# Patient Record
Sex: MALE | Race: WHITE | NOT HISPANIC OR LATINO | URBAN - METROPOLITAN AREA
[De-identification: names, ages, dates, MRNs, and addresses within clinical notes are randomized per-mention and may not be internally consistent; named-entity substitution may affect disease eponyms.]

---

## 2023-11-17 ENCOUNTER — ANCILLARY PROCEDURE (OUTPATIENT)
Dept: RADIOLOGY | Facility: CLINIC | Age: 53
End: 2023-11-17
Payer: COMMERCIAL

## 2023-11-17 ENCOUNTER — OFFICE VISIT (OUTPATIENT)
Dept: PRIMARY CARE | Facility: CLINIC | Age: 53
End: 2023-11-17
Payer: COMMERCIAL

## 2023-11-17 VITALS
SYSTOLIC BLOOD PRESSURE: 132 MMHG | TEMPERATURE: 97.2 F | BODY MASS INDEX: 33.5 KG/M2 | WEIGHT: 247 LBS | OXYGEN SATURATION: 98 % | DIASTOLIC BLOOD PRESSURE: 78 MMHG | HEART RATE: 62 BPM

## 2023-11-17 DIAGNOSIS — M25.572 ACUTE LEFT ANKLE PAIN: Primary | ICD-10-CM

## 2023-11-17 DIAGNOSIS — M25.572 ACUTE LEFT ANKLE PAIN: ICD-10-CM

## 2023-11-17 PROCEDURE — 1036F TOBACCO NON-USER: CPT | Performed by: FAMILY MEDICINE

## 2023-11-17 PROCEDURE — 73610 X-RAY EXAM OF ANKLE: CPT | Mod: LT,FY

## 2023-11-17 PROCEDURE — 99213 OFFICE O/P EST LOW 20 MIN: CPT | Performed by: FAMILY MEDICINE

## 2023-11-17 RX ORDER — TADALAFIL 5 MG/1
TABLET ORAL
COMMUNITY
Start: 2022-01-31 | End: 2024-01-22

## 2023-11-17 RX ORDER — RISANKIZUMAB-RZAA 150 MG/ML
INJECTION SUBCUTANEOUS
COMMUNITY
Start: 2022-02-02

## 2023-11-17 ASSESSMENT — PATIENT HEALTH QUESTIONNAIRE - PHQ9
1. LITTLE INTEREST OR PLEASURE IN DOING THINGS: NOT AT ALL
SUM OF ALL RESPONSES TO PHQ9 QUESTIONS 1 AND 2: 0
2. FEELING DOWN, DEPRESSED OR HOPELESS: NOT AT ALL

## 2023-11-17 ASSESSMENT — PAIN SCALES - GENERAL: PAINLEVEL: 0-NO PAIN

## 2023-11-18 NOTE — PROGRESS NOTES
Subjective   Patient ID: Ezequiel Florence is a 53 y.o. male who presents for Ankle Pain (X several months following injury ) and Joint Swelling.    HPI   Barron presents with wife complaining of left ankle sprain.  He has had several episodes of twisting his ankle.  After a week or so it starts to feel better and then he sprains it again.  Swelling on lateral aspect.  Tender when he walks. No instability. No bruising.    Review of Systems  All other systems have been reviewed and are negative except as noted in the HPI.      Objective   /78 (BP Location: Left arm)   Pulse 62   Temp 36.2 °C (97.2 °F) (Temporal)   Wt 112 kg (247 lb)   SpO2 98%   BMI 33.50 kg/m²     Physical Exam  Alert. NAD. Left ankle with moderate swelling laterally.  No calf tenderness.  No pain in achilles. Tender over lateral malleolus. No joint instability.    Assessment/Plan   Diagnoses and all orders for this visit:  Acute left ankle pain  -     XR ankle left 3+ views; Future  - Likely recurrent sprain but xray given repeated injury and bony tenderness.  - Wear boot for 2 weeks.  ROM exercises. After 2 weeks, weight bear in good supportive shoes as tolerated.  Strengthening exercises when he can walk without pain.  If no improvement, will call and refer to podiatry.

## 2023-11-22 ENCOUNTER — DOCUMENTATION (OUTPATIENT)
Dept: PRIMARY CARE | Facility: CLINIC | Age: 53
End: 2023-11-22
Payer: COMMERCIAL

## 2024-01-22 DIAGNOSIS — N52.9 ERECTILE DYSFUNCTION, UNSPECIFIED ERECTILE DYSFUNCTION TYPE: Primary | ICD-10-CM

## 2024-01-22 RX ORDER — TADALAFIL 5 MG/1
5 TABLET ORAL DAILY
Qty: 90 TABLET | Refills: 3 | Status: SHIPPED | OUTPATIENT
Start: 2024-01-22

## 2024-02-05 ENCOUNTER — TELEPHONE (OUTPATIENT)
Dept: PRIMARY CARE | Facility: CLINIC | Age: 54
End: 2024-02-05
Payer: COMMERCIAL

## 2024-02-09 ENCOUNTER — OFFICE VISIT (OUTPATIENT)
Dept: PRIMARY CARE | Facility: CLINIC | Age: 54
End: 2024-02-09
Payer: COMMERCIAL

## 2024-02-09 ENCOUNTER — TELEPHONE (OUTPATIENT)
Dept: PRIMARY CARE | Facility: CLINIC | Age: 54
End: 2024-02-09

## 2024-02-09 VITALS
BODY MASS INDEX: 31.27 KG/M2 | OXYGEN SATURATION: 97 % | WEIGHT: 230.6 LBS | HEART RATE: 69 BPM | DIASTOLIC BLOOD PRESSURE: 76 MMHG | SYSTOLIC BLOOD PRESSURE: 130 MMHG

## 2024-02-09 DIAGNOSIS — M79.641 RIGHT HAND PAIN: Primary | ICD-10-CM

## 2024-02-09 DIAGNOSIS — L40.9 PSORIASIS: ICD-10-CM

## 2024-02-09 DIAGNOSIS — Z01.84 ANTIBODY RESPONSE EXAM: ICD-10-CM

## 2024-02-09 DIAGNOSIS — Z00.00 WELL ADULT EXAM: ICD-10-CM

## 2024-02-09 DIAGNOSIS — I10 ESSENTIAL (PRIMARY) HYPERTENSION: ICD-10-CM

## 2024-02-09 DIAGNOSIS — E78.00 PURE HYPERCHOLESTEROLEMIA, UNSPECIFIED: ICD-10-CM

## 2024-02-09 PROBLEM — K76.0 FATTY LIVER: Status: ACTIVE | Noted: 2021-04-20

## 2024-02-09 PROBLEM — N40.1 BPH ASSOCIATED WITH NOCTURIA: Status: ACTIVE | Noted: 2020-01-06

## 2024-02-09 PROBLEM — R35.1 BPH ASSOCIATED WITH NOCTURIA: Status: ACTIVE | Noted: 2020-01-06

## 2024-02-09 PROCEDURE — 1036F TOBACCO NON-USER: CPT | Performed by: FAMILY MEDICINE

## 2024-02-09 PROCEDURE — 3075F SYST BP GE 130 - 139MM HG: CPT | Performed by: FAMILY MEDICINE

## 2024-02-09 PROCEDURE — 3078F DIAST BP <80 MM HG: CPT | Performed by: FAMILY MEDICINE

## 2024-02-09 PROCEDURE — 99214 OFFICE O/P EST MOD 30 MIN: CPT | Performed by: FAMILY MEDICINE

## 2024-02-09 PROCEDURE — 86580 TB INTRADERMAL TEST: CPT | Performed by: FAMILY MEDICINE

## 2024-02-09 ASSESSMENT — PATIENT HEALTH QUESTIONNAIRE - PHQ9
1. LITTLE INTEREST OR PLEASURE IN DOING THINGS: NOT AT ALL
2. FEELING DOWN, DEPRESSED OR HOPELESS: NOT AT ALL
SUM OF ALL RESPONSES TO PHQ9 QUESTIONS 1 AND 2: 0

## 2024-02-09 ASSESSMENT — PAIN SCALES - GENERAL: PAINLEVEL: 4

## 2024-02-09 NOTE — PROGRESS NOTES
Subjective   Patient ID: Ezequiel Florence is a 53 y.o. male who presents for Pain in right hand .    HPI   Barron presents complaining of right hand pain. Located in right first finger from PIP down into hand.  No injury.  Still has intermittent pain in ankle after the sprain.  Also has noted issue in left knee.  Also needs TB test.    Review of Systems  All other systems have been reviewed and are negative except as noted in the HPI.     Objective   /76   Pulse 69   Wt 105 kg (230 lb 9.6 oz)   SpO2 97%   BMI 31.27 kg/m²     Physical Exam  Alert.  No acute distress.  Strength is 5 out of 5 and equal in bilateral hands.  Diffuse tenderness along the extensor tendon of his first finger.  No nodules palpable.  Full range of motion of his fingers.  No swelling.  No rash.    Assessment/Plan   Diagnoses and all orders for this visit:  Right hand pain  -     XR hand right 3+ views; Future  - Given his underlying psoriasis will obtain x-ray to evaluate for the joints.  Discussed possibility of underlying psoriatic arthritis given several joints involved.  Also possible to be just osteoarthritis.  Will also refer to rheumatology.  Psoriasis  -     XR hand right 3+ views; Future  -     Referral to Rheumatology; Future  Antibody response exam  Other orders  -     TB Skin Test

## 2024-02-12 ENCOUNTER — CLINICAL SUPPORT (OUTPATIENT)
Dept: PRIMARY CARE | Facility: CLINIC | Age: 54
End: 2024-02-12
Payer: COMMERCIAL

## 2024-02-12 LAB
INDURATION: 0 MM
TB SKIN TEST: NEGATIVE

## 2024-02-19 ENCOUNTER — OFFICE VISIT (OUTPATIENT)
Dept: RHEUMATOLOGY | Facility: CLINIC | Age: 54
End: 2024-02-19
Payer: COMMERCIAL

## 2024-02-19 VITALS
WEIGHT: 235 LBS | BODY MASS INDEX: 31.87 KG/M2 | DIASTOLIC BLOOD PRESSURE: 84 MMHG | SYSTOLIC BLOOD PRESSURE: 132 MMHG | HEART RATE: 66 BPM

## 2024-02-19 DIAGNOSIS — L40.50 PSORIATIC ARTHRITIS (MULTI): Primary | ICD-10-CM

## 2024-02-19 DIAGNOSIS — L40.9 PSORIASIS: ICD-10-CM

## 2024-02-19 PROCEDURE — 99205 OFFICE O/P NEW HI 60 MIN: CPT | Performed by: STUDENT IN AN ORGANIZED HEALTH CARE EDUCATION/TRAINING PROGRAM

## 2024-02-19 PROCEDURE — 1036F TOBACCO NON-USER: CPT | Performed by: STUDENT IN AN ORGANIZED HEALTH CARE EDUCATION/TRAINING PROGRAM

## 2024-02-19 PROCEDURE — 3079F DIAST BP 80-89 MM HG: CPT | Performed by: STUDENT IN AN ORGANIZED HEALTH CARE EDUCATION/TRAINING PROGRAM

## 2024-02-19 PROCEDURE — 3075F SYST BP GE 130 - 139MM HG: CPT | Performed by: STUDENT IN AN ORGANIZED HEALTH CARE EDUCATION/TRAINING PROGRAM

## 2024-02-19 NOTE — PROGRESS NOTES
Subjective   Patient ID: Ezequiel Florence is a 53 y.o. male who presents for Joint Pain (NPV to establish care. Referred by PCP. C/o joint pain of rt hand with swelling. ).  HPI: New consult from Dr. Medina    Male with a history of psoriasis on Skyrizi, hx of back surgery in 1999 for an injury of a bulging disc/microdiscectomy,here for joint pain. Treated by Dr Dent; started around 2022, It helps psoriasis a lot    Regarding patient's joint pain, he has rolled his ankle before from stepping on toys; he also has a hx of left knee tendinitis. Rolled Left ankle around 2022, and it healed, but then it continued to recur. Patient's started having right hand pain in the index finger around December 2023; he states its more swollen. It feels uncomfortable, through the second MCP, PIP, and carpal bones. Patient wonders if he has psoriatic arthritis. Patient has discomfort daily, but it doesn't stop him from doing it,     Has morning stiffness of ankles and lower back until he starts moving.No uveitis. No history of blood in stool.       Past meds:  Enbrel- patient thinks it slightly lost efficacy, so was switched around 2022    Labs: WBC, Hgb., platelets normal    2024 PPD neg  2021 Hep C Ab n eg    Rheumatology specific review of systems   joint pain, morning stiffness of ankles and lower back, fevers , chills, unintentional weight loss, rashes, alopecia, mouth sores, nasal ulcers, , Raynauds, morning stiffness in back, dry eyes, dry mouth, blood clots, rheum fam hx    Chronic pain specific review of systems      Objective   /84 (BP Location: Left arm, Patient Position: Sitting, BP Cuff Size: Large adult)   Pulse 66   Wt 107 kg (235 lb)   BMI 31.87 kg/m²       Physical Exam  Constitutional: Alert and in no acute distress. Well developed, well nourished  Head and Face: Head and face: Normal.    Cardiovascular: Heart rate and rhythm were normal, normal S1 and S2. No peripheral edema.   Pulmonary: No  "respiratory distress. Clear bilateral breath sounds.  Musculoskeletal: synovitis over Right 2nd MCP, no psoriatic plaques visualized, no enthesitis  Skin: Normal skin color and pigmentation, normal skin turgor, and no rash.    Psychiatric: Judgment and insight: Intact. Mood and affect: Normal.     Lab Results   Component Value Date    WBC 4.7 06/30/2023    HGB 15.1 06/30/2023    HCT 43.7 06/30/2023     06/30/2023    ALT 18 06/30/2023    AST 16 06/30/2023    CREATININE 0.8 06/30/2023          No results found for: \"ANANP\", \"ANATITERADD\", \"ANACO\", \"ANAPATTRN\", \"ANPA2\", \"FANAP\", \"ANATITER\", \"ANAT2\", \"VANESSA\", \"CDCANA\", \"DSDN\", \"EMPSMRNP\", \"SCLN\", \"SCLABQ\", \"SCIB\", \"CTIB\", \"FLOGTZ11\", \"HJDHUE02\", \"ASSB\", \"SSBB\", \"C3\", \"C4\", \"UAMICCOMM\", \"UTPCR\", \"ANTIRIBO\", \"ACEN\", \"SEDRATE\", \"NONUHFIRE\", \"POCESR\", \"CRP\", \"RF\", \"CCPIGGQUAL\"\\            There is currently no information documented on the homunculus. Go to the Rheumatology activity and complete the homunculus joint exam.       Colonoscopy  Tubular adenoma fragments - Dr. Burkett          Assessment/Plan:  #Psoriatic Arthritis, currently mild  -Manifestations: Intermittent Achilles pain left, intermittent left patellar tendinitis, right second digit pain, intermittent morning stiffness  -Patient is already on Skyrizi for his psoriasis, and his psoriasis is well-controlled.  Skyrizi is probably also helping his psoriatic arthritis  -Recommended over-the-counter NSAIDs (oral and topical) for now, as his arthritis seems mild, and I do not think he needs additional immunosuppression at this time  -If I am to add something in the future, I would likely add Otezla.  I gave patient a handout on this today.  -If his dermatologist Dr. Dent is ever to change Biologics for his psoriasis, I would recommend that a biologic is used that is used for both psoriasis and psoriatic arthritis  -Will get x-rays of hands and SI joints today  -Regarding right second finger pain, I " recommend the following regimen:ibuprofen 400-600 mg with food two to three times a day scheduled for 2 weeks (If really bad, you can use 800 mg)  Voltaren gel over that finger (right index finger) four times a day scheduled for 2 weeks  -Afterwards switch to as needed     #Preventative Health Recommendations for Primary Care Providers    Vaccine Recommendations:    From ACR 2022 vaccine recommendations:    For Rheumatic and musculoskeletal disease (RMD) patients aged greater than or equal to 65 years, and RMD patients aged >18 and <65 years who are on immunosuppressive medication, giving high-dose or adjuvanted influenza vaccination is conditionally  recommended over giving regular-dose influenza vaccination.    For patients with RMD aged <65 years who are on immunosuppressive medication, pneumococcal vaccination is strongly recommended.    For patients with RMD aged >18 years who are on immunosuppressive medication, administering the recombinant zoster vaccine is strongly recommended.    For patients with RMD aged >26 and <45 years who are on immunosuppressive medication and not previously vaccinated, vaccination against HPV is conditionally recommended.    Cardiovascular Recommendations:    Patients with inflammatory diseases are at high risk for cardiovascular disease, and should be aggressively screened by primary care physicians and started on lipid-lowering medications when appropriate.    Bone Health Recommendations:    Patients on steroids should be on calcium and Vitamin D. Patients with inflammatory rheumatic diseases are higher risk for osteoporosis and should have baseline DEXA screening.     Patient counseled to seek medical care if any new or worsening symptoms, urgently if needed.      Note will be sent to primary care doctor and dermatologist Dr Dent    Return to clinic in 4-6 mo sooner if needed    Dragon dictation software was used to dictate this note. Errors may have occurred during dictation  that was not intended by the user.

## 2024-02-19 NOTE — PATIENT INSTRUCTIONS
Xrays of hands and lower back    Ibuprofen 400-600 mg with food two to three times a day scheduled for 2 weeks (If really bad, you can use 800 mg)  Voltaren gel over that finger (right index finger) four times a day scheduled for 2 weeks    Afterwards switch to as needed

## 2024-07-12 ENCOUNTER — APPOINTMENT (OUTPATIENT)
Dept: PRIMARY CARE | Facility: CLINIC | Age: 54
End: 2024-07-12
Payer: COMMERCIAL

## 2024-11-05 ENCOUNTER — TELEPHONE (OUTPATIENT)
Dept: PRIMARY CARE | Facility: CLINIC | Age: 54
End: 2024-11-05
Payer: COMMERCIAL

## 2024-11-05 DIAGNOSIS — N52.9 ERECTILE DYSFUNCTION, UNSPECIFIED ERECTILE DYSFUNCTION TYPE: ICD-10-CM

## 2024-11-05 RX ORDER — TADALAFIL 5 MG/1
5 TABLET ORAL DAILY
Qty: 30 TABLET | Refills: 0 | Status: SHIPPED | OUTPATIENT
Start: 2024-11-05 | End: 2024-12-05

## 2024-11-05 NOTE — TELEPHONE ENCOUNTER
SLEEP EVALUATION NOTE     Claudy Shukla is a 70 year old male presenting for evaluation of: Consultation (PSG 8/20/20)    Referring provider: Nafisa Cartagena MD  PCP: Nafisa Cartagena MD       [facility]   PSG done 8/20/20. Retiring at the end of the year.  8-9 hours of sleep per night.  Sleep position: Bed  Sleep onset latency: 5 minutes  Sleeping aid medications: No  Awakenings # and episodes of nocturia: Denies    [x]Snoring  []Witnessed apneas  [x]Dyspneic arousal  []Morning dry mouth  []Morning headache     [x]Non-restorative sleep  [x]Excessive daytime sleepiness or tired during the day  [x]Naps    []Urge to move legs and/or uncomfortable tingling or burning in legs  [x]Cramping or jerking of the legs during sleep +PLMs on PSG. Pt. Does not notice this, noticed by wife as well.  []Cataplexy  []Sleep paralysis  []Hypnogognic or hypnopompnic hallucinations  []Sleep attacks    []Parasomnias:      PRIOR SLEEP STUDY DATE: 8/20/2020  RESULTS: 1. AHI: 20.4                   2. DESATURATION MATT: 83%                   CURRENT CO-MORBIDITIES:    [] HTN     [] AF/ARRHYTHMIA    [] HYPOTHYROIDISM   []OTHER:  [] CAD     [] OBESITY                  [] INSOMNIA  [] CHF     [] DM                         [] STROKE/TIA          Branford Sleepiness Scale:     0 = would never doze  1 = slight chance of dozing  2 = moderate chance of dozing  3 = high chance of dozing    Situation     Chance of Dozing       1. Sitting and reading   1  2. Watching TV    1  3. Sitting, inactive in a public place       (e.g. a theatre or a meeting)  0    4. As a passenger in a car for an hour       without a break    0    5. Lying down to rest in the afternoon      when circumstances permit  2  6. Sitting and talking to someone  0    7. Sitting quietly after lunch without      Alcohol     0    8. In a car, while stopped for a few      Minutes in traffic    0      TOTAL 4      Past Medical History:     Past Medical History:   Diagnosis Date   •  Patient called for refill [phone]  tadalafil 5mg  [facility]   Essential (primary) hypertension    • Hyperlipidemia    • Non Hodgkin's lymphoma (CMS/HCC)    • CYNTHIA on CPAP 11/12/2020     History reviewed. No pertinent surgical history.  History reviewed. No pertinent family history.  Social History     Tobacco Use   • Smoking status: Never Smoker   • Smokeless tobacco: Never Used   Substance Use Topics   • Alcohol use: Not Currently   • Drug use: Never       ALLERGIES:  No Known Allergies  Current Outpatient Medications   Medication Sig   • amLODIPine (NORVASC) 10 MG tablet TK 1 T PO QD   • atorvastatin (LIPITOR) 40 MG tablet TK 1 T PO QD HS   • benazepril-hydrochlorthiazide (LOTENSIN HCT) 20-12.5 MG per tablet TK 2 TS PO QD     No current facility-administered medications for this visit.         Review of Systems:     Review of Systems   Constitution: Positive for fever.   HENT: Negative.    Eyes: Negative.    Cardiovascular: Negative.    Respiratory: Negative.    Endocrine: Negative.    Hematologic/Lymphatic: Negative.    Skin: Negative.    Musculoskeletal: Negative.    Gastrointestinal: Negative.    Neurological: Negative.    Psychiatric/Behavioral: Negative.    All other systems reviewed and are negative.      Physical Examination:     Vitals:    11/12/20 1513   BP: (!) 150/92   Pulse: 74   Resp: 20   Temp: 99 °F (37.2 °C)   SpO2: 97%   Weight: 91.2 kg (201 lb)   Height: 5' 5\" (1.651 m)     Body mass index is 33.45 kg/m².      Neck Circumference: 18 in  Nasal Passages: [x]Clear   [] Congested  Palate: Bacon [] I  [] II   [] III   [x] IV              Eyrtherna/edema [x]none []+1  []+2  []+3  []+4    Gen: No acute distress. Pleasant and interactive.  Head:  Normocephalic and atraumatic.  Eyes: Conjunctiva and sclera normal.   Heart:  Normal rate and regular rhythm, no murmur.  Lungs:  Normal respiratory rate and effort, breath sounds equal.  Extremities:  No edema in lower extremities.   Skin: Warm and dry, no rash.  Musculoskeletal:  No joint swelling or tenderness  Psych:   Affect was appropriate to situation and mood was normal.  Neuro:  Alert and oriented, attention and recall preserved, cranial nerves intact, motor strength preserved, coordination intact, sensation preserved, reflexes symmetric, gait normal.     Assessment and Plan:     PROBLEMS ADDRESSED THIS VISIT:  Problem List Items Addressed This Visit        Respiratory    CYNTHIA on CPAP - Primary           Patient Counseling:     • We reviewed the nature of sleep apnea, the elevated cardiovascular risks and other health consequences associated with this condition and reviewed treatment options in detail.  • The patient was cautioned not to drive while sleepy.  CPAP 9-15 cwp ordered through HME.  Pt. Knows to request to be evaluated for ALEXANDER and Thyroid disease at next PCP visit due to PLMs. Per pt. They do not bother him or his wife.  F/u in 3 months to review compliance and efficacy of therapy.  Fabiola Franco, CNP

## 2024-12-10 ENCOUNTER — LAB (OUTPATIENT)
Dept: LAB | Facility: LAB | Age: 54
End: 2024-12-10
Payer: COMMERCIAL

## 2024-12-10 DIAGNOSIS — Z00.00 WELL ADULT EXAM: ICD-10-CM

## 2024-12-10 DIAGNOSIS — I10 ESSENTIAL (PRIMARY) HYPERTENSION: ICD-10-CM

## 2024-12-10 DIAGNOSIS — E78.00 PURE HYPERCHOLESTEROLEMIA, UNSPECIFIED: ICD-10-CM

## 2024-12-10 DIAGNOSIS — R73.01 IFG (IMPAIRED FASTING GLUCOSE): Primary | ICD-10-CM

## 2024-12-10 LAB
ALBUMIN SERPL BCP-MCNC: 4.6 G/DL (ref 3.4–5)
ALP SERPL-CCNC: 43 U/L (ref 33–120)
ALT SERPL W P-5'-P-CCNC: 50 U/L (ref 10–52)
ANION GAP SERPL CALCULATED.3IONS-SCNC: 10 MMOL/L (ref 10–20)
APPEARANCE UR: CLEAR
AST SERPL W P-5'-P-CCNC: 41 U/L (ref 9–39)
BASOPHILS # BLD AUTO: 0.05 X10*3/UL (ref 0–0.1)
BASOPHILS NFR BLD AUTO: 1.1 %
BILIRUB SERPL-MCNC: 0.7 MG/DL (ref 0–1.2)
BILIRUB UR STRIP.AUTO-MCNC: NEGATIVE MG/DL
BUN SERPL-MCNC: 10 MG/DL (ref 6–23)
CALCIUM SERPL-MCNC: 9.5 MG/DL (ref 8.6–10.3)
CHLORIDE SERPL-SCNC: 105 MMOL/L (ref 98–107)
CHOLEST SERPL-MCNC: 137 MG/DL (ref 0–199)
CHOLEST/HDLC SERPL: 2.5 {RATIO}
CO2 SERPL-SCNC: 28 MMOL/L (ref 21–32)
COLOR UR: YELLOW
CREAT SERPL-MCNC: 0.76 MG/DL (ref 0.5–1.3)
EGFRCR SERPLBLD CKD-EPI 2021: >90 ML/MIN/1.73M*2
EOSINOPHIL # BLD AUTO: 0.11 X10*3/UL (ref 0–0.7)
EOSINOPHIL NFR BLD AUTO: 2.5 %
ERYTHROCYTE [DISTWIDTH] IN BLOOD BY AUTOMATED COUNT: 11.4 % (ref 11.5–14.5)
GLUCOSE SERPL-MCNC: 115 MG/DL (ref 74–99)
GLUCOSE UR STRIP.AUTO-MCNC: NORMAL MG/DL
HCT VFR BLD AUTO: 44.2 % (ref 41–52)
HDLC SERPL-MCNC: 55.5 MG/DL
HGB BLD-MCNC: 15.4 G/DL (ref 13.5–17.5)
IMM GRANULOCYTES # BLD AUTO: 0.03 X10*3/UL (ref 0–0.7)
IMM GRANULOCYTES NFR BLD AUTO: 0.7 % (ref 0–0.9)
KETONES UR STRIP.AUTO-MCNC: NEGATIVE MG/DL
LDLC SERPL CALC-MCNC: 62 MG/DL
LEUKOCYTE ESTERASE UR QL STRIP.AUTO: NEGATIVE
LYMPHOCYTES # BLD AUTO: 1.31 X10*3/UL (ref 1.2–4.8)
LYMPHOCYTES NFR BLD AUTO: 29.7 %
MCH RBC QN AUTO: 32.9 PG (ref 26–34)
MCHC RBC AUTO-ENTMCNC: 34.8 G/DL (ref 32–36)
MCV RBC AUTO: 94 FL (ref 80–100)
MONOCYTES # BLD AUTO: 0.53 X10*3/UL (ref 0.1–1)
MONOCYTES NFR BLD AUTO: 12 %
NEUTROPHILS # BLD AUTO: 2.38 X10*3/UL (ref 1.2–7.7)
NEUTROPHILS NFR BLD AUTO: 54 %
NITRITE UR QL STRIP.AUTO: NEGATIVE
NON HDL CHOLESTEROL: 82 MG/DL (ref 0–149)
NRBC BLD-RTO: 0 /100 WBCS (ref 0–0)
PH UR STRIP.AUTO: 6 [PH]
PLATELET # BLD AUTO: 309 X10*3/UL (ref 150–450)
POTASSIUM SERPL-SCNC: 4.4 MMOL/L (ref 3.5–5.3)
PROT SERPL-MCNC: 7.2 G/DL (ref 6.4–8.2)
PROT UR STRIP.AUTO-MCNC: NEGATIVE MG/DL
RBC # BLD AUTO: 4.68 X10*6/UL (ref 4.5–5.9)
RBC # UR STRIP.AUTO: NEGATIVE /UL
SODIUM SERPL-SCNC: 139 MMOL/L (ref 136–145)
SP GR UR STRIP.AUTO: 1.02
TRIGL SERPL-MCNC: 100 MG/DL (ref 0–149)
UROBILINOGEN UR STRIP.AUTO-MCNC: NORMAL MG/DL
VLDL: 20 MG/DL (ref 0–40)
WBC # BLD AUTO: 4.4 X10*3/UL (ref 4.4–11.3)

## 2024-12-10 PROCEDURE — 36415 COLL VENOUS BLD VENIPUNCTURE: CPT

## 2024-12-10 PROCEDURE — 80053 COMPREHEN METABOLIC PANEL: CPT

## 2024-12-10 PROCEDURE — 83036 HEMOGLOBIN GLYCOSYLATED A1C: CPT

## 2024-12-10 PROCEDURE — 80061 LIPID PANEL: CPT

## 2024-12-10 PROCEDURE — 81003 URINALYSIS AUTO W/O SCOPE: CPT

## 2024-12-10 PROCEDURE — 85025 COMPLETE CBC W/AUTO DIFF WBC: CPT

## 2024-12-11 LAB
EST. AVERAGE GLUCOSE BLD GHB EST-MCNC: 94 MG/DL
HBA1C MFR BLD: 4.9 %

## 2024-12-13 ENCOUNTER — APPOINTMENT (OUTPATIENT)
Dept: PRIMARY CARE | Facility: CLINIC | Age: 54
End: 2024-12-13
Payer: COMMERCIAL

## 2024-12-13 VITALS
BODY MASS INDEX: 33.32 KG/M2 | DIASTOLIC BLOOD PRESSURE: 96 MMHG | HEIGHT: 72 IN | SYSTOLIC BLOOD PRESSURE: 150 MMHG | OXYGEN SATURATION: 98 % | HEART RATE: 70 BPM | RESPIRATION RATE: 18 BRPM | WEIGHT: 246 LBS

## 2024-12-13 DIAGNOSIS — I10 ESSENTIAL (PRIMARY) HYPERTENSION: ICD-10-CM

## 2024-12-13 DIAGNOSIS — N40.1 BPH ASSOCIATED WITH NOCTURIA: ICD-10-CM

## 2024-12-13 DIAGNOSIS — Z00.00 WELL ADULT EXAM: Primary | ICD-10-CM

## 2024-12-13 DIAGNOSIS — R35.1 BPH ASSOCIATED WITH NOCTURIA: ICD-10-CM

## 2024-12-13 PROBLEM — S46.019A STRAIN OF ROTATOR CUFF CAPSULE: Status: ACTIVE | Noted: 2023-03-28

## 2024-12-13 PROBLEM — R79.89 ELEVATED LIVER FUNCTION TESTS: Status: ACTIVE | Noted: 2024-12-13

## 2024-12-13 PROBLEM — R79.9 ABNORMAL BLOOD CHEMISTRY LEVEL: Status: ACTIVE | Noted: 2021-03-31

## 2024-12-13 PROBLEM — R20.2 PARESTHESIA: Status: ACTIVE | Noted: 2021-03-31

## 2024-12-13 PROBLEM — L08.9 LOCALIZED INFECTION OF SKIN: Status: ACTIVE | Noted: 2024-12-13

## 2024-12-13 PROCEDURE — 1036F TOBACCO NON-USER: CPT | Performed by: FAMILY MEDICINE

## 2024-12-13 PROCEDURE — 3008F BODY MASS INDEX DOCD: CPT | Performed by: FAMILY MEDICINE

## 2024-12-13 PROCEDURE — 3077F SYST BP >= 140 MM HG: CPT | Performed by: FAMILY MEDICINE

## 2024-12-13 PROCEDURE — 3080F DIAST BP >= 90 MM HG: CPT | Performed by: FAMILY MEDICINE

## 2024-12-13 PROCEDURE — 86580 TB INTRADERMAL TEST: CPT | Performed by: FAMILY MEDICINE

## 2024-12-13 PROCEDURE — 99396 PREV VISIT EST AGE 40-64: CPT | Performed by: FAMILY MEDICINE

## 2024-12-13 RX ORDER — TADALAFIL 5 MG/1
5 TABLET ORAL DAILY
Qty: 90 TABLET | Refills: 3 | Status: SHIPPED | OUTPATIENT
Start: 2024-12-13 | End: 2025-12-13

## 2024-12-13 ASSESSMENT — ENCOUNTER SYMPTOMS
ABDOMINAL PAIN: 0
UNEXPECTED WEIGHT CHANGE: 0
CHILLS: 0
NUMBNESS: 0
WEAKNESS: 0
VOMITING: 0
MYALGIAS: 0
COUGH: 0
DYSPHORIC MOOD: 0
TROUBLE SWALLOWING: 0
SHORTNESS OF BREATH: 0
NAUSEA: 0
HEMATURIA: 0
FEVER: 0
DIFFICULTY URINATING: 0
SORE THROAT: 0
ARTHRALGIAS: 0
NERVOUS/ANXIOUS: 0
BLOOD IN STOOL: 0

## 2024-12-13 ASSESSMENT — PATIENT HEALTH QUESTIONNAIRE - PHQ9
SUM OF ALL RESPONSES TO PHQ9 QUESTIONS 1 AND 2: 0
2. FEELING DOWN, DEPRESSED OR HOPELESS: NOT AT ALL
1. LITTLE INTEREST OR PLEASURE IN DOING THINGS: NOT AT ALL

## 2024-12-13 ASSESSMENT — PAIN SCALES - GENERAL: PAINLEVEL_OUTOF10: 0-NO PAIN

## 2024-12-13 NOTE — PROGRESS NOTES
Subjective   Patient ID: Ezequiel Florence is a 54 y.o. male who presents for Annual Exam (Patient is here for his annual wellness exam patient refused the flu shot).    HPI  Patient Care Team:  Monserrat Medina MD as PCP - General (Family Medicine)  Monserrat Medina MD as PCP - O [facility] PCP    Ezequiel Floernce is seen for comprehensive physical exam.  PMH, PSH, family history and social history were reviewed and updated.   Colonoscopy -  normal, Brother diagnosed and  from stage 4 colon cancer .   HTN - was an issue when working stressful job in his early 40s, off all medication for the past 2 years without any recurrence of elevated blood pressure.  CT calcium score  Total 0. Weight up so presumed his BP would be up, started working out again, had been smoking again this summer but stopped that again  Psoriasis - seeing dermatologist regularly  Heartburn - episodic and intermittent, no issues now,     Review of Systems   Constitutional:  Negative for chills, fever and unexpected weight change.   HENT:  Negative for congestion, hearing loss, postnasal drip, sore throat and trouble swallowing.    Eyes:  Negative for visual disturbance.   Respiratory:  Negative for cough and shortness of breath.    Cardiovascular:  Negative for chest pain and leg swelling.   Gastrointestinal:  Negative for abdominal pain, blood in stool, nausea and vomiting.   Genitourinary:  Negative for difficulty urinating and hematuria.   Musculoskeletal:  Negative for arthralgias and myalgias.   Skin:  Negative for rash.   Neurological:  Negative for weakness and numbness.   Psychiatric/Behavioral:  Negative for dysphoric mood. The patient is not nervous/anxious.    All other systems reviewed and are negative.      Objective   BP (!) 150/96 (BP Location: Left arm, Patient Position: Sitting, BP Cuff Size: Large adult)   Pulse 70   Resp 18   Ht 1.829 m (6')   Wt 112 kg (246 lb)   SpO2 98%   BMI 33.36 kg/m²      Physical Exam  Vitals and nursing note reviewed.   Constitutional:       Appearance: Normal appearance.   HENT:      Head: Normocephalic.      Right Ear: Tympanic membrane and ear canal normal.      Left Ear: Tympanic membrane and ear canal normal.      Nose: Nose normal.      Mouth/Throat:      Mouth: Mucous membranes are moist.   Eyes:      Extraocular Movements: Extraocular movements intact.      Pupils: Pupils are equal, round, and reactive to light.   Neck:      Vascular: No carotid bruit.   Cardiovascular:      Rate and Rhythm: Normal rate and regular rhythm.   Pulmonary:      Effort: Pulmonary effort is normal.      Breath sounds: Normal breath sounds.   Abdominal:      General: Abdomen is flat. Bowel sounds are normal.      Palpations: Abdomen is soft.      Tenderness: There is no abdominal tenderness.   Musculoskeletal:         General: Normal range of motion.      Cervical back: Normal range of motion.   Lymphadenopathy:      Cervical: No cervical adenopathy.   Skin:     General: Skin is warm.      Findings: No rash.   Neurological:      General: No focal deficit present.      Mental Status: He is alert.   Psychiatric:         Mood and Affect: Mood normal.       Assessment/Plan   Assessment & Plan  Well adult exam  Preventative measures discussed in detail.  Immunizations reviewed and discussed.  Reviewed labs with patient.       Essential (primary) hypertension  Borderline. Resume home monitoring weekly. DASH diet,  Resume daily exercise       BPH associated with nocturia  Controlled. Continue with tadalafil nightly  Orders:    tadalafil (Cialis) 5 mg tablet; Take 1 tablet (5 mg) by mouth once daily.        Follow up 6 MONTHS, sooner with any problems or concerns.

## 2024-12-13 NOTE — ASSESSMENT & PLAN NOTE
Controlled. Continue with tadalafil nightly  Orders:    tadalafil (Cialis) 5 mg tablet; Take 1 tablet (5 mg) by mouth once daily.

## 2024-12-16 ENCOUNTER — APPOINTMENT (OUTPATIENT)
Dept: PRIMARY CARE | Facility: CLINIC | Age: 54
End: 2024-12-16
Payer: COMMERCIAL

## 2024-12-16 VITALS — TEMPERATURE: 97.8 F

## 2024-12-16 LAB
INDURATION: 72 MM
TB SKIN TEST: NEGATIVE

## 2024-12-23 DIAGNOSIS — R35.1 BPH ASSOCIATED WITH NOCTURIA: ICD-10-CM

## 2024-12-23 DIAGNOSIS — N40.1 BPH ASSOCIATED WITH NOCTURIA: ICD-10-CM

## 2024-12-23 RX ORDER — TADALAFIL 5 MG/1
5 TABLET ORAL DAILY
Qty: 90 TABLET | Refills: 3 | Status: SHIPPED | OUTPATIENT
Start: 2024-12-23

## 2024-12-30 ENCOUNTER — TELEPHONE (OUTPATIENT)
Dept: PRIMARY CARE | Facility: CLINIC | Age: 54
End: 2024-12-30
Payer: COMMERCIAL

## 2024-12-30 NOTE — TELEPHONE ENCOUNTER
Patient is trying to get the med refill for his Tadalafil but they will not give him the meds because they are stating they need a KEITH per patient.    [facility]    Last seen on 12/13/2024

## 2025-08-05 ENCOUNTER — ANCILLARY PROCEDURE (OUTPATIENT)
Dept: URGENT CARE | Age: 55
End: 2025-08-05
Payer: COMMERCIAL

## 2025-08-05 ENCOUNTER — OFFICE VISIT (OUTPATIENT)
Dept: URGENT CARE | Age: 55
End: 2025-08-05
Payer: COMMERCIAL

## 2025-08-05 VITALS
WEIGHT: 246 LBS | RESPIRATION RATE: 16 BRPM | TEMPERATURE: 97.8 F | SYSTOLIC BLOOD PRESSURE: 180 MMHG | HEART RATE: 90 BPM | DIASTOLIC BLOOD PRESSURE: 101 MMHG | BODY MASS INDEX: 33.36 KG/M2 | OXYGEN SATURATION: 97 %

## 2025-08-05 DIAGNOSIS — S92.334A NONDISPLACED FRACTURE OF THIRD METATARSAL BONE, RIGHT FOOT, INITIAL ENCOUNTER FOR CLOSED FRACTURE: Primary | ICD-10-CM

## 2025-08-05 DIAGNOSIS — S90.31XA CONTUSION OF RIGHT FOOT, INITIAL ENCOUNTER: ICD-10-CM

## 2025-08-05 PROCEDURE — L3260 AMBULATORY SURGICAL BOOT EAC: HCPCS | Performed by: PHYSICIAN ASSISTANT

## 2025-08-05 PROCEDURE — 3080F DIAST BP >= 90 MM HG: CPT | Performed by: PHYSICIAN ASSISTANT

## 2025-08-05 PROCEDURE — 73630 X-RAY EXAM OF FOOT: CPT | Mod: RIGHT SIDE | Performed by: PHYSICIAN ASSISTANT

## 2025-08-05 PROCEDURE — 99214 OFFICE O/P EST MOD 30 MIN: CPT | Performed by: PHYSICIAN ASSISTANT

## 2025-08-05 PROCEDURE — 3077F SYST BP >= 140 MM HG: CPT | Performed by: PHYSICIAN ASSISTANT

## 2025-08-05 ASSESSMENT — ENCOUNTER SYMPTOMS: ARTHRALGIAS: 1

## 2025-08-05 NOTE — PROGRESS NOTES
"Subjective   Patient ID: Ezequiel Florence is a 55 y.o. male. They present today with a chief complaint of Foot Pain (Patient was moving a grill and missed a step and dropped it on right foot yesterday ).    History of Present Illness      Past Medical History  Allergies as of 08/05/2025    (No Known Allergies)       Prescriptions Prior to Admission[1]     Medical History[2]    Surgical History[3]     reports that he has quit smoking. His smoking use included cigarettes. He has been exposed to tobacco smoke. He has never used smokeless tobacco. He reports current alcohol use. He reports current drug use. Drug: Marijuana.    Review of Systems  Review of Systems   Musculoskeletal:  Positive for arthralgias.                                  Objective    Vitals:    08/05/25 0755   TempSrc: Oral     No LMP for male patient.        Procedures    Point of Care Test & Imaging Results from this visit  No results found for this visit on 08/05/25.   Imaging  No results found.    Cardiology, Vascular, and Other Imaging  No other imaging results found for the past 2 days      Diagnostic study results (if any) were reviewed by MELODY Tanner.    Assessment/Plan   Allergies, medications, history, and pertinent labs/EKGs/Imaging reviewed by MELODY Tanner.     Medical Decision Making  ***    Orders and Diagnoses  There are no diagnoses linked to this encounter.    Medical Admin Record      Patient disposition: { Disposition:67992::\"Home\"}    Electronically signed by MELODY Tanner  7:59 AM           [1] (Not in a hospital admission)  [2] History reviewed. No pertinent past medical history.  [3]   Past Surgical History:  Procedure Laterality Date    COLONOSCOPY  06/17/2022    HIP SURGERY  1999    NASAL SEPTUM SURGERY  12/05/2018     "

## 2025-08-05 NOTE — PATIENT INSTRUCTIONS
Please follow up with your primary provider within one week if symptoms do not improve.  You may schedule an appointment online at [email or web address] or call [phone]. Go to the Emergency Department if symptoms significantly worsen    Maintain post op shoe for the next 10 days. You may progressively bear more weight after with a firm supportive shoe.     Elevate the foot, take ibuprofen as needed, and apply ice intermittently.

## 2025-08-05 NOTE — PROGRESS NOTES
Subjective   Patient ID: Ezequiel Florence is a 55 y.o. male. They present today with a chief complaint of Foot Pain (Patient was moving a grill and missed a step and dropped it on right foot yesterday ).    History of Present Illness  States he accidentally dropped a grill on the top of his right foot at 5 PM last night.  Endorses mild swelling and significant tenderness to the right dorsal 1st through 3rd metatarsals.  Denies numbness, tingling.  Remains able to bear weight.  States he can take a few steps, however it is painful.  Denies any pre-existing injuries          Past Medical History  Allergies as of 08/05/2025    (No Known Allergies)       Prescriptions Prior to Admission[1]     Medical History[2]    Surgical History[3]     reports that he has quit smoking. His smoking use included cigarettes. He has been exposed to tobacco smoke. He has never used smokeless tobacco. He reports current alcohol use. He reports current drug use. Drug: Marijuana.    Review of Systems  Pertinent systems reviewed and were negative unless otherwise stated in HPI.    Objective    Vitals:    08/05/25 0755   BP: (!) 180/101   BP Location: Left arm   Patient Position: Sitting   BP Cuff Size: Adult   Pulse: 90   Resp: 16   Temp: 36.6 °C (97.8 °F)   TempSrc: Oral   SpO2: 97%   Weight: 112 kg (246 lb)     No LMP for male patient.    Physical Exam    Musculoskeletal:      Right knee: Normal.      Right ankle: Normal.      Right foot: Normal capillary refill. Swelling and tenderness (dosral 1-3rd metatarsals) present. No deformity or laceration. Normal pulse.       === 08/05/25 ===    XR FOOT 3+ VIEWS RIGHT    Addendum 8/5/2025  9:15 AM -------------------------------------------------  Interpreted By:  Vanessa Woodall,  ADDENDUM:  Slight irregularity of the proximal 3rd metatarsal on oblique view  only possibly related to projection however nondisplaced fracture not  entirely excluded. Correlation with site of injury and  overlying  point tenderness recommended. Follow-up radiographs may be helpful.    Signed by: Vanessa Woodall 8/5/2025 9:15 AM    -------- ORIGINAL REPORT --------  Dictation workstation:   [id number]    - Impression -  No evidence of fracture. Mild productive/degenerative changes.    MACRO:  None.    Signed by: Vanessa Woodall 8/5/2025 8:42 AM  Dictation workstation:   [id number]     Signed by: Vanessa Woodall 8/5/2025 8:42 AM  Dictation workstation:   [id number]     Diagnostic study results (if any) were reviewed by Alexis Lance PA-C.    Assessment/Plan   Allergies, medications, history, and pertinent labs/EKGs/imaging reviewed by Alexis Lance PA-C.     Medical Decision Making  RLE NVI. Post op shoe fitted and instructions verbalized with understanding. No indication for ortho    Orders and Diagnoses  Diagnoses and all orders for this visit:  Nondisplaced fracture of third metatarsal bone, right foot, initial encounter for closed fracture  -     XR foot right 3+ views; Future  -     Post-op shoe    Medical Admin Record    Disposition: Home    Electronically signed by Alexis Lance PA-C       [1] (Not in a hospital admission)   [2] History reviewed. No pertinent past medical history.  [3]   Past Surgical History:  Procedure Laterality Date    COLONOSCOPY  06/17/2022    HIP SURGERY  1999    NASAL SEPTUM SURGERY  12/05/2018

## 2025-12-05 ENCOUNTER — APPOINTMENT (OUTPATIENT)
Dept: PRIMARY CARE | Facility: CLINIC | Age: 55
End: 2025-12-05
Payer: COMMERCIAL